# Patient Record
Sex: FEMALE | Race: WHITE | NOT HISPANIC OR LATINO | URBAN - METROPOLITAN AREA
[De-identification: names, ages, dates, MRNs, and addresses within clinical notes are randomized per-mention and may not be internally consistent; named-entity substitution may affect disease eponyms.]

---

## 2021-05-01 ENCOUNTER — OFFICE (OUTPATIENT)
Dept: URBAN - METROPOLITAN AREA CLINIC 106 | Facility: CLINIC | Age: 56
End: 2021-05-01

## 2021-07-15 ENCOUNTER — OFFICE (OUTPATIENT)
Dept: URBAN - METROPOLITAN AREA CLINIC 72 | Facility: CLINIC | Age: 56
End: 2021-07-15

## 2021-07-15 VITALS
DIASTOLIC BLOOD PRESSURE: 73 MMHG | HEIGHT: 64 IN | WEIGHT: 194 LBS | SYSTOLIC BLOOD PRESSURE: 130 MMHG | HEART RATE: 66 BPM

## 2021-07-15 DIAGNOSIS — R14.0 ABDOMINAL DISTENSION (GASEOUS): ICD-10-CM

## 2021-07-15 DIAGNOSIS — R09.89 OTHER SPECIFIED SYMPTOMS AND SIGNS INVOLVING THE CIRCULATORY: ICD-10-CM

## 2021-07-15 DIAGNOSIS — R10.13 EPIGASTRIC PAIN: ICD-10-CM

## 2021-07-15 PROCEDURE — 99214 OFFICE O/P EST MOD 30 MIN: CPT | Performed by: NURSE PRACTITIONER

## 2024-03-21 ENCOUNTER — TELEHEALTH PROVIDED OTHER THAN IN PATIENT'S HOME (OUTPATIENT)
Dept: URBAN - METROPOLITAN AREA CLINIC 72 | Facility: CLINIC | Age: 59
End: 2024-03-21

## 2024-03-21 VITALS — WEIGHT: 198 LBS | HEIGHT: 64 IN

## 2024-03-21 DIAGNOSIS — R09.89 OTHER SPECIFIED SYMPTOMS AND SIGNS INVOLVING THE CIRCULATORY: ICD-10-CM

## 2024-03-21 DIAGNOSIS — K58.9 IRRITABLE BOWEL SYNDROME WITHOUT DIARRHEA: ICD-10-CM

## 2024-03-21 DIAGNOSIS — R10.10 UPPER ABDOMINAL PAIN, UNSPECIFIED: ICD-10-CM

## 2024-03-21 DIAGNOSIS — K59.00 CONSTIPATION, UNSPECIFIED: ICD-10-CM

## 2024-03-21 DIAGNOSIS — R14.0 ABDOMINAL DISTENSION (GASEOUS): ICD-10-CM

## 2024-03-21 PROCEDURE — 99213 OFFICE O/P EST LOW 20 MIN: CPT | Mod: 95 | Performed by: INTERNAL MEDICINE

## 2024-03-21 RX ORDER — OMEPRAZOLE 40 MG/1
40 CAPSULE, DELAYED RELEASE ORAL
Qty: 90 | Refills: 3 | Status: ACTIVE

## 2024-03-21 RX ORDER — SUCRALFATE 1 G/1
TABLET ORAL
Qty: 60 | Refills: 0 | Status: ACTIVE
Start: 2024-03-21

## 2024-03-21 NOTE — SERVICENOTES
Telehealth Platform Used: 
Location of patient: work 
Location of provider
Other persons participating: no
- stop famotidine
- take omepraole before breakfast and before dinner
- take sucralfate/carafate at night before bed. 
- schedule EGD,

## 2024-03-21 NOTE — SERVICEHPINOTES
is seen today for a follow-up visit. 
yomaira burnette.She was initially seen by Dr Munoz for mutiple GI symptoms 11/2020. HSe has seen DR. Sadia Candelario is on protonix 40 mg a day. SHe has chronic stomach issues for several years. She has epistric pain that feel like something there. It is a pressure, burning or gnawwing. Eating doesn't make a difference. THe feeling of a lump in her throat is assocaited with that. She notices gas, bloating, pressure, bleching. Eating sometimes makes that worse. Greasy foods sometimes makes things worse. She had her gallbaldder removed years ago. SHe had an upper endoscopy about a year ago wtih DR. Munoz. He last colonosocpy was at that time. She doens't think that they found anything. SHe does have a hiatal hernia. She is not sure if she had biopsies. She does not do artificial sweeteners or diet drinks. She does not drink milk because it hurts her stomach. SHe has had a GES which was normal (tennoval/mary). She thinks she has had a cat scan as well.Yumiko is on protonix, she has taken it so long and she is not sure if it makes a difference. She had a colonoscopy age 54. No polyps.She used ot have IBS-D and that got better and normalized. In the last month she has noted soem constipation. She has taken something for that which has made her BM better but did not help the bloating.She returns today, 7/15/2021brDr. Alexander competed EGD December 2, 2020. The exam was normal. Esophageal biopsies were consistent with acid reflux negative for Nieves's esophagus negative for disaccharidases enzyme deficiency negative celiac.Yumiko did well after we added famotidine 40 at night. 2 weeks ago she started developing recurrent symptoms with bloating epigastric pain daily after eating. It makes no difference on what she eats. She has some nausea no vomiting. Bowel movements have not changed. She goes every other day. She did take Ex-Lax twice and had good clean out to see if that would make a difference and it did not. She has globus but no burning or dealings of acid regurgitation. She denies dysphasia. No records were received from Dr sadia Munoz.br 

br Interval History: 3/21/24
br
br In the middle of the night she wakes up with coughing spells.  It is a dry, choking cough.  Her pcp stopped one BP med (lisinopril) and started another one.  
br
br She has indigestion, epigastric pain.  there is an achy feeling.  It is pretty constant.  SHe feels bloated as well.  She feels like the lump is in her throat again.  She has been having symptoms for about 1-2 months.  She is omeprazole 40 mg and she has been on it for a couple years.  She is taking famotidine 40 mg at night as well.  She just hahs some vague.  br
yomaira She rarely hahs cough during the day, mostly at night.  No artificial sweeteners, no diet drinks, no milk.  
br
yomaira Kumar thinks she had a colonoscopy a round age [age].  
br
br bowel movements are relatively normal but hse has been constipated in the recent past.  
br She took monjuerno but she has been off it about 2-3 moths. yomaira burnette My nurse has reviewed and updated the medication list with the patient (medication reconciliation). I have also reviewed the medication list. New updates were made to the patient's medical, social and family history. Pertinent details are also noted above in the HPI.

## 2024-04-11 ENCOUNTER — AMBULATORY SURGICAL CENTER (OUTPATIENT)
Dept: URBAN - METROPOLITAN AREA SURGERY 19 | Facility: SURGERY | Age: 59
End: 2024-04-11

## 2024-04-11 DIAGNOSIS — R10.13 EPIGASTRIC PAIN: ICD-10-CM

## 2024-04-11 LAB
RELEVANT H&P ENDOSCOPY: (no result)
RELEVANT H&P ENDOSCOPY: (no result)

## 2024-04-11 PROCEDURE — 43235 EGD DIAGNOSTIC BRUSH WASH: CPT | Performed by: INTERNAL MEDICINE
